# Patient Record
Sex: MALE | Race: WHITE | NOT HISPANIC OR LATINO | URBAN - NONMETROPOLITAN AREA
[De-identification: names, ages, dates, MRNs, and addresses within clinical notes are randomized per-mention and may not be internally consistent; named-entity substitution may affect disease eponyms.]

---

## 2017-09-18 PROBLEM — H35.3231: Noted: 2017-09-18

## 2017-09-18 PROBLEM — Z96.1: Noted: 2017-09-18

## 2017-09-18 PROBLEM — H16.223: Noted: 2017-09-18

## 2017-09-18 PROBLEM — E11.9: Noted: 2017-09-18

## 2017-09-18 PROBLEM — H26.493: Noted: 2017-09-18

## 2019-03-25 ENCOUNTER — IMPORTED ENCOUNTER (OUTPATIENT)
Dept: URBAN - NONMETROPOLITAN AREA CLINIC 1 | Facility: CLINIC | Age: 84
End: 2019-03-25

## 2019-03-25 PROCEDURE — 92014 COMPRE OPH EXAM EST PT 1/>: CPT

## 2019-03-25 PROCEDURE — 92134 CPTRZ OPH DX IMG PST SGM RTA: CPT

## 2019-03-25 NOTE — PATIENT DISCUSSION
Macular degeneration exudative:. OU-Discussed findings of exam in detail with the patient.-Discussed the importance of nutritional supplements to decrease the progression of AMD.-Discussed the use of amsler grid and signs of worsening of the disease. .-Pt to perform amsler grid EVERYDAY. -Continue Preservision daily. -ARMD now wet OS. -Pt has an appt in April with BAM-Keep apt w/ BAM-OCT MAC performed and reviewed with pt. irreg. thickness no exudates OU -Order Fundus photos next visit. RTC: 6 mo DFE & Fundus photos DM s DRBS: i have not checked [id number]: 6.7 checked within 2019-Stressed the importance of keeping blood sugars under control and regular visits with PCP. -Explained the possible effects of poorly controlled diabetes and the damage that diabetes can cause to ocular health. -Pt instructed to contact our office with any vision changes. Type 2 DM since 2012. Last A1C was 7.1% in Jan 2018. Letter to [facility] -Explained GRECIA and associated symptoms.-Recommend increasing Omega 3s.-Continue OU QID PRN. Pt will contact us if this does not provide relief. Consider punctal plugs in that case. s/p PCIOL-Stable PCIOL OU.-Monitor for PCO.; Dr's Notes: MAC OCT - 3/25/19DFE 3/25/19

## 2019-09-23 ENCOUNTER — IMPORTED ENCOUNTER (OUTPATIENT)
Dept: URBAN - NONMETROPOLITAN AREA CLINIC 1 | Facility: CLINIC | Age: 84
End: 2019-09-23

## 2019-09-23 PROCEDURE — 92014 COMPRE OPH EXAM EST PT 1/>: CPT

## 2019-09-23 PROCEDURE — 92250 FUNDUS PHOTOGRAPHY W/I&R: CPT

## 2019-09-23 NOTE — PATIENT DISCUSSION
May consider VF 24-2 after next visit due to right ON appearanceMacular degeneration exudative:. OU-Discussed findings of exam in detail with the patient.-Discussed the importance of nutritional supplements to decrease the progression of AMD.-Discussed the use of amsler grid and signs of worsening of the disease. .-Pt to perform amsler grid EVERYDAY. -Continue Preservision daily. -ARMD now wet OS. -Fundus done today 9/23/2019:OD:Marginal quality Nerve appears healthy macula poorly visibleOS: Advanced macular damage DM s DRBS: 148A1C: 6.3 checked within 2019-Stressed the importance of keeping blood sugars under control and regular visits with PCP. -Explained the possible effects of poorly controlled diabetes and the damage that diabetes can cause to ocular health. -Pt instructed to contact our office with any vision changes. Type 2 DM since 2012. Last A1C was 7.1% in Jan 2018. K-Sicca -Explained GRECIA and associated symptoms.-Recommend increasing Omega 3s.-Continue OU QID PRN. Pt will contact us if this does not provide relief. Consider punctal plugs in that case. s/p PCIOL-Stable PCIOL OU.-Monitor for PCO.; Dr's Notes: MAC OCT - 3/25/19DFE 9/23/2019Fundus 9/23/2019

## 2020-06-30 ENCOUNTER — IMPORTED ENCOUNTER (OUTPATIENT)
Dept: URBAN - NONMETROPOLITAN AREA CLINIC 1 | Facility: CLINIC | Age: 85
End: 2020-06-30

## 2020-06-30 PROCEDURE — 92134 CPTRZ OPH DX IMG PST SGM RTA: CPT

## 2020-06-30 PROCEDURE — 92014 COMPRE OPH EXAM EST PT 1/>: CPT

## 2020-06-30 NOTE — PATIENT DISCUSSION
AMD - wet-Explained dry AMD and advised that there are no treatments available at this time.-Continue AREDS 2 MVT. -Continue Amsler grid monitoring daily. Pt is to contact our office if any changes are noted. keep appt with dr. Racheal jones DR-Stressed the importance of keeping blood sugars under control blood pressure under control and weight normalization and regular visits with PCP. -Explained the possible effects of poorly controlled diabetes and the damage that diabetes can cause to ocular health. -Patient to check HgbA1C.-Pt instructed to contact our office with any vision changes. GRECIA-Explained GRECIA and associated symptoms.-Recommend increasing Omega 3s.-Pt to begin artificial tears OU QID PRN. Pt will contact us if this does not provide relief. Consider punctal plugs in that case. s/p PCIOL-Stable PCIOL OU.-Monitor for PCO.; 's Notes: MAC OCT - 3/25/19DFE 9/23/2019Fundus 9/23/2019

## 2022-04-09 ASSESSMENT — TONOMETRY
OD_IOP_MMHG: 11
OD_IOP_MMHG: 12
OS_IOP_MMHG: 10
OD_IOP_MMHG: 10
OS_IOP_MMHG: 11
OS_IOP_MMHG: 12

## 2022-04-09 ASSESSMENT — VISUAL ACUITY
OS_SC: 20/50
OS_SC: 20/50+2
OD_SC: 20/40
OD_CC: 20/40
OD_SC: 20/50-2
OS_CC: 20/50